# Patient Record
Sex: MALE | URBAN - METROPOLITAN AREA
[De-identification: names, ages, dates, MRNs, and addresses within clinical notes are randomized per-mention and may not be internally consistent; named-entity substitution may affect disease eponyms.]

---

## 2019-02-27 ENCOUNTER — APPOINTMENT (RX ONLY)
Dept: URBAN - METROPOLITAN AREA CLINIC 101 | Facility: CLINIC | Age: 74
Setting detail: DERMATOLOGY
End: 2019-02-27

## 2019-02-27 PROBLEM — C44.1192 BASAL CELL CARCINOMA OF SKIN OF LEFT LOWER EYELID, INCLUDING CANTHUS: Status: ACTIVE | Noted: 2019-02-27

## 2019-02-27 PROCEDURE — ? MOHS SURGERY

## 2019-02-27 PROCEDURE — 17312 MOHS ADDL STAGE: CPT

## 2019-02-27 PROCEDURE — 17311 MOHS 1 STAGE H/N/HF/G: CPT

## 2019-02-27 PROCEDURE — ? ADDITIONAL NOTES

## 2019-02-27 NOTE — PROCEDURE: MOHS SURGERY
Body Location Override (Optional - Billing Will Still Be Based On Selected Body Map Location If Applicable): left lower eyelid medial cathus

## 2022-12-27 NOTE — PROCEDURE: MOHS SURGERY
From: Gale Hernandez  To: Nay Duke  Sent: 12/26/2022 9:50 AM CST  Subject: Ritalin    Morning,    I like the way I feel with this new pill even though it is a bit more expensive. I requested a refill, but it is only for 15 days. Can I please have a month supply?    -Gale Hernandez   Estimated Blood Loss (Cc): minimal

## 2024-05-02 NOTE — PROCEDURE: MOHS SURGERY
Spoke to Javier Case's mom. In patient appt scheduled for 05/29/2024 at 2:00pm with Dr. Diaz for recurrent pilonidal cyst.    Epidermal Autograft Text: The defect edges were debeveled with a #15 scalpel blade.  Given the location of the defect, shape of the defect and the proximity to free margins an epidermal autograft was deemed most appropriate.  Using a sterile surgical marker, the primary defect shape was transferred to the donor site. The epidermal graft was then harvested.  The skin graft was then placed in the primary defect and oriented appropriately.